# Patient Record
Sex: FEMALE | Race: WHITE | NOT HISPANIC OR LATINO | URBAN - METROPOLITAN AREA
[De-identification: names, ages, dates, MRNs, and addresses within clinical notes are randomized per-mention and may not be internally consistent; named-entity substitution may affect disease eponyms.]

---

## 2020-12-14 ENCOUNTER — TELEPHONE ENCOUNTER (OUTPATIENT)
Dept: URBAN - METROPOLITAN AREA CLINIC 2 | Facility: CLINIC | Age: 35
End: 2020-12-14

## 2020-12-15 ENCOUNTER — TELEPHONE ENCOUNTER (OUTPATIENT)
Dept: URBAN - METROPOLITAN AREA CLINIC 2 | Facility: CLINIC | Age: 35
End: 2020-12-15

## 2020-12-16 ENCOUNTER — TELEPHONE ENCOUNTER (OUTPATIENT)
Dept: URBAN - METROPOLITAN AREA CLINIC 80 | Facility: CLINIC | Age: 35
End: 2020-12-16

## 2020-12-17 ENCOUNTER — LAB OUTSIDE AN ENCOUNTER (OUTPATIENT)
Dept: URBAN - METROPOLITAN AREA TELEHEALTH 2 | Facility: TELEHEALTH | Age: 35
End: 2020-12-17

## 2020-12-17 ENCOUNTER — OFFICE VISIT (OUTPATIENT)
Dept: URBAN - METROPOLITAN AREA TELEHEALTH 2 | Facility: TELEHEALTH | Age: 35
End: 2020-12-17
Payer: MEDICARE

## 2020-12-17 DIAGNOSIS — R10.84 GENERALIZED ABDOMINAL PAIN: ICD-10-CM

## 2020-12-17 DIAGNOSIS — K51.911 ULCERATIVE COLITIS WITH RECTAL BLEEDING, UNSPECIFIED LOCATION: ICD-10-CM

## 2020-12-17 PROCEDURE — 99214 OFFICE O/P EST MOD 30 MIN: CPT | Performed by: INTERNAL MEDICINE

## 2020-12-17 PROCEDURE — G8482 FLU IMMUNIZE ORDER/ADMIN: HCPCS | Performed by: INTERNAL MEDICINE

## 2020-12-17 PROCEDURE — G8427 DOCREV CUR MEDS BY ELIG CLIN: HCPCS | Performed by: INTERNAL MEDICINE

## 2020-12-17 PROCEDURE — 1036F TOBACCO NON-USER: CPT | Performed by: INTERNAL MEDICINE

## 2020-12-17 PROCEDURE — G9903 PT SCRN TBCO ID AS NON USER: HCPCS | Performed by: INTERNAL MEDICINE

## 2020-12-17 RX ORDER — PREDNISONE 10 MG/1
1 TABLET TABLET ORAL ONCE A DAY
Qty: 30 | OUTPATIENT
Start: 2020-12-17

## 2020-12-17 RX ORDER — MESALAMINE 1.2 G/1
4 TABLETS TABLET, DELAYED RELEASE ORAL ONCE A DAY
Qty: 120 TABLET | Refills: 2 | OUTPATIENT
Start: 2020-12-17 | End: 2021-03-17

## 2020-12-17 NOTE — HPI-TODAY'S VISIT:
35 yr old female with ulcerative colitis not seen since 2018 as she lost insurance. Her symptoms began worsening July 2020. this time she has pain in lower abdomen along with bleeding and diarrhea. We sent Rx for mesalamine enema but she did not get call from pharmacy to . no antibioitcs recently or  hospitalization. since her last visit she was diagnosed as bipolar.

## 2020-12-18 LAB
A/G RATIO: 1.8
ALBUMIN: 4.2
ALKALINE PHOSPHATASE: 67
ALT (SGPT): 19
AST (SGOT): 18
BASO (ABSOLUTE): 0.1
BASOS: 1
BILIRUBIN, TOTAL: <0.2
BUN/CREATININE RATIO: 9
BUN: 8
C-REACTIVE PROTEIN, QUANT: 4
CALCIUM: 8.8
CARBON DIOXIDE, TOTAL: 23
CHLORIDE: 106
CREATININE: 0.94
EGFR IF AFRICN AM: 92
EGFR IF NONAFRICN AM: 79
EOS (ABSOLUTE): 0.2
EOS: 3
GLOBULIN, TOTAL: 2.4
GLUCOSE: 90
HEMATOCRIT: 37
HEMATOLOGY COMMENTS:: (no result)
HEMOGLOBIN: 11.5
IMMATURE CELLS: (no result)
IMMATURE GRANS (ABS): 0
IMMATURE GRANULOCYTES: 0
LYMPHS (ABSOLUTE): 2.8
LYMPHS: 33
MCH: 28.6
MCHC: 31.1
MCV: 92
MONOCYTES(ABSOLUTE): 0.9
MONOCYTES: 10
NEUTROPHILS (ABSOLUTE): 4.6
NEUTROPHILS: 53
NRBC: (no result)
PLATELETS: 381
POTASSIUM: 4.2
PROTEIN, TOTAL: 6.6
RBC: 4.02
RDW: 11.8
SODIUM: 142
WBC: 8.5

## 2020-12-21 LAB — GASTROINTESTINAL PATHOGEN: (no result)

## 2021-01-12 ENCOUNTER — OFFICE VISIT (OUTPATIENT)
Dept: URBAN - METROPOLITAN AREA CLINIC 2 | Facility: CLINIC | Age: 36
End: 2021-01-12

## 2021-01-14 ENCOUNTER — OFFICE VISIT (OUTPATIENT)
Dept: URBAN - METROPOLITAN AREA SURGERY CENTER 19 | Facility: SURGERY CENTER | Age: 36
End: 2021-01-14

## 2021-02-09 ENCOUNTER — OFFICE VISIT (OUTPATIENT)
Dept: URBAN - METROPOLITAN AREA CLINIC 2 | Facility: CLINIC | Age: 36
End: 2021-02-09
Payer: MEDICARE

## 2021-02-09 ENCOUNTER — WEB ENCOUNTER (OUTPATIENT)
Dept: URBAN - METROPOLITAN AREA CLINIC 2 | Facility: CLINIC | Age: 36
End: 2021-02-09

## 2021-02-09 DIAGNOSIS — K51.00 ULCERATIVE PANCOLITIS WITHOUT COMPLICATION: ICD-10-CM

## 2021-02-09 PROCEDURE — G8482 FLU IMMUNIZE ORDER/ADMIN: HCPCS | Performed by: INTERNAL MEDICINE

## 2021-02-09 PROCEDURE — G8427 DOCREV CUR MEDS BY ELIG CLIN: HCPCS | Performed by: INTERNAL MEDICINE

## 2021-02-09 PROCEDURE — G8417 CALC BMI ABV UP PARAM F/U: HCPCS | Performed by: INTERNAL MEDICINE

## 2021-02-09 PROCEDURE — G9903 PT SCRN TBCO ID AS NON USER: HCPCS | Performed by: INTERNAL MEDICINE

## 2021-02-09 PROCEDURE — 99214 OFFICE O/P EST MOD 30 MIN: CPT | Performed by: INTERNAL MEDICINE

## 2021-02-09 RX ORDER — MESALAMINE 1.2 G/1
4 TABLETS TABLET, DELAYED RELEASE ORAL ONCE A DAY
Qty: 120 TABLET | Refills: 2 | Status: DISCONTINUED | COMMUNITY
Start: 2020-12-17 | End: 2021-03-17

## 2021-02-09 RX ORDER — PREDNISONE 10 MG/1
1 TABLET TABLET ORAL ONCE A DAY
Qty: 30 | Status: DISCONTINUED | COMMUNITY
Start: 2020-12-17

## 2021-02-09 RX ORDER — MESALAMINE 1.2 G/1
3 TABLETS TABLET, DELAYED RELEASE ORAL ONCE A DAY
Qty: 90 | Refills: 2 | OUTPATIENT
Start: 2021-02-09 | End: 2021-05-10

## 2021-02-09 RX ORDER — PREDNISONE 20 MG/1
1 TABLET TABLET ORAL ONCE A DAY
Qty: 30 | OUTPATIENT
Start: 2021-02-09

## 2021-02-09 NOTE — HPI-OTHER HISTORIES
She comes in today for interval follow up. She recently re-established care in our clinic.  She was started on prednsione and noted feeling much better.  She did not restart mesalamine.  She had to cancel her colonoscopy.  She stopped her prednisone and her symptoms have all returned.

## 2021-03-22 ENCOUNTER — OFFICE VISIT (OUTPATIENT)
Dept: URBAN - METROPOLITAN AREA SURGERY CENTER 19 | Facility: SURGERY CENTER | Age: 36
End: 2021-03-22
Payer: MEDICARE

## 2021-03-22 DIAGNOSIS — K51.00 CHRONIC PANCOLONIC ULCERATIVE COLITIS: ICD-10-CM

## 2021-03-22 PROCEDURE — G8907 PT DOC NO EVENTS ON DISCHARG: HCPCS | Performed by: INTERNAL MEDICINE

## 2021-03-22 PROCEDURE — 45380 COLONOSCOPY AND BIOPSY: CPT | Performed by: INTERNAL MEDICINE

## 2021-03-22 RX ORDER — PREDNISONE 20 MG/1
1 TABLET TABLET ORAL ONCE A DAY
Qty: 30 | Status: ACTIVE | COMMUNITY
Start: 2021-02-09

## 2021-03-22 RX ORDER — MESALAMINE 1.2 G/1
3 TABLETS TABLET, DELAYED RELEASE ORAL ONCE A DAY
Qty: 90 | Refills: 2 | Status: ACTIVE | COMMUNITY
Start: 2021-02-09 | End: 2021-05-10

## 2021-03-23 ENCOUNTER — OFFICE VISIT (OUTPATIENT)
Dept: URBAN - METROPOLITAN AREA CLINIC 2 | Facility: CLINIC | Age: 36
End: 2021-03-23

## 2021-05-11 ENCOUNTER — OFFICE VISIT (OUTPATIENT)
Dept: URBAN - METROPOLITAN AREA CLINIC 2 | Facility: CLINIC | Age: 36
End: 2021-05-11
Payer: MEDICARE

## 2021-05-11 DIAGNOSIS — K51.011 ULCERATIVE PANCOLITIS WITH RECTAL BLEEDING: ICD-10-CM

## 2021-05-11 PROBLEM — 444548001: Status: ACTIVE | Noted: 2021-05-11

## 2021-05-11 PROCEDURE — 99214 OFFICE O/P EST MOD 30 MIN: CPT | Performed by: INTERNAL MEDICINE

## 2021-05-11 RX ORDER — PREDNISONE 20 MG/1
1 TABLET TABLET ORAL ONCE A DAY
Qty: 30 | Status: ACTIVE | COMMUNITY
Start: 2021-02-09

## 2021-05-11 NOTE — HPI-TODAY'S VISIT:
She comes in today for interval follow up.  She is continuing to have issues with diarrhea and bleeding despite compliance with mesalamine and prednisone.  She is interested in discussing additional treatment options.  Her colonoscopy was notable for signficant activity in the left colon.

## 2021-05-13 LAB
A/G RATIO: (no result)
A/G RATIO: 1.9
ALBUMIN: (no result)
ALBUMIN: 4.4
ALKALINE PHOSPHATASE: (no result)
ALKALINE PHOSPHATASE: 49
ALT (SGPT): (no result)
ALT (SGPT): 17
AST (SGOT): (no result)
AST (SGOT): 19
BASO (ABSOLUTE): 0.1
BASOS: 1
BILIRUBIN, TOTAL: (no result)
BILIRUBIN, TOTAL: 0.2
BUN/CREATININE RATIO: (no result)
BUN/CREATININE RATIO: 14
BUN: (no result)
BUN: 13
C-REACTIVE PROTEIN, QUANT: <1
CALCIUM: (no result)
CALCIUM: 9.2
CARBON DIOXIDE, TOTAL: (no result)
CARBON DIOXIDE, TOTAL: 22
CHLORIDE: (no result)
CHLORIDE: 105
CREATININE: (no result)
CREATININE: 0.9
EGFR IF AFRICN AM: (no result)
EGFR IF AFRICN AM: 96
EGFR IF NONAFRICN AM: (no result)
EGFR IF NONAFRICN AM: 83
EOS (ABSOLUTE): 0.1
EOS: 1
GLOBULIN, TOTAL: (no result)
GLOBULIN, TOTAL: 2.3
GLUCOSE: (no result)
GLUCOSE: 103
HBSAG SCREEN: NEGATIVE
HEMATOCRIT: 34.9
HEMATOLOGY COMMENTS:: (no result)
HEMOGLOBIN: 10.7
HEP A AB, IGM: NEGATIVE
HEP B CORE AB, IGM: NEGATIVE
HEP C VIRUS AB: <0.1
IMMATURE CELLS: (no result)
IMMATURE GRANS (ABS): 0
IMMATURE GRANULOCYTES: 0
LYMPHS (ABSOLUTE): 4.2
LYMPHS: 47
MCH: 29.2
MCHC: 30.7
MCV: 95
MONOCYTES(ABSOLUTE): 0.7
MONOCYTES: 7
NEUTROPHILS (ABSOLUTE): 3.9
NEUTROPHILS: 44
NRBC: (no result)
PLATELETS: 312
POTASSIUM: (no result)
POTASSIUM: 4.7
PROTEIN, TOTAL: (no result)
PROTEIN, TOTAL: 6.7
QUANTIFERON CRITERIA: (no result)
QUANTIFERON INCUBATION: (no result)
QUANTIFERON MITOGEN VALUE: 3.58
QUANTIFERON NIL VALUE: 0
QUANTIFERON TB1 AG VALUE: 0
QUANTIFERON TB2 AG VALUE: 0
QUANTIFERON-TB GOLD PLUS: NEGATIVE
RBC: 3.67
RDW: 13.2
REQUEST PROBLEM: (no result)
SODIUM: (no result)
SODIUM: 139
WBC: 9

## 2021-06-22 ENCOUNTER — TELEPHONE ENCOUNTER (OUTPATIENT)
Dept: URBAN - METROPOLITAN AREA CLINIC 92 | Facility: CLINIC | Age: 36
End: 2021-06-22

## 2021-07-16 ENCOUNTER — TELEPHONE ENCOUNTER (OUTPATIENT)
Dept: URBAN - METROPOLITAN AREA CLINIC 80 | Facility: CLINIC | Age: 36
End: 2021-07-16

## 2021-08-31 ENCOUNTER — OFFICE VISIT (OUTPATIENT)
Dept: URBAN - METROPOLITAN AREA CLINIC 2 | Facility: CLINIC | Age: 36
End: 2021-08-31

## 2021-08-31 ENCOUNTER — TELEPHONE ENCOUNTER (OUTPATIENT)
Dept: URBAN - METROPOLITAN AREA CLINIC 92 | Facility: CLINIC | Age: 36
End: 2021-08-31

## 2021-08-31 RX ORDER — PREDNISONE 20 MG/1
1 TABLET TABLET ORAL ONCE A DAY
Qty: 30 | Status: ACTIVE | COMMUNITY
Start: 2021-02-09

## 2021-08-31 RX ORDER — HYDROCORTISONE ACETATE 25 MG/1
1 SUPPOSITORY SUPPOSITORY RECTAL ONCE A DAY
Qty: 14 | Refills: 1 | OUTPATIENT
Start: 2021-08-31 | End: 2021-09-28

## 2022-01-06 ENCOUNTER — OFFICE VISIT (OUTPATIENT)
Dept: URBAN - METROPOLITAN AREA CLINIC 2 | Facility: CLINIC | Age: 37
End: 2022-01-06
Payer: MEDICARE

## 2022-01-06 DIAGNOSIS — K51.811 OTHER ULCERATIVE COLITIS WITH RECTAL BLEEDING: ICD-10-CM

## 2022-01-06 PROCEDURE — 99214 OFFICE O/P EST MOD 30 MIN: CPT | Performed by: NURSE PRACTITIONER

## 2022-01-06 RX ORDER — PREDNISONE 20 MG/1
1 TABLET TABLET ORAL ONCE A DAY
Qty: 30 | Status: ACTIVE | COMMUNITY
Start: 2021-02-09

## 2022-01-06 RX ORDER — BUDESONIDE 3 MG/1
3 CAPSULES DAILY FOR 2 WEEKS, THEN 2 TABS FOR 2 WEEKS THEN 1 TABLET FOR 2 WEEKS CAPSULE, COATED PELLETS ORAL ONCE A DAY
Qty: 84 CAPSULE | Refills: 0 | OUTPATIENT
Start: 2022-01-06

## 2022-01-06 NOTE — HPI-TODAY'S VISIT:
Very pleasant 36-year-old female with history of ulcerative colitis seen today for possible flare.  Symptoms have been well controlled since she started Lialda earlier this year.  Just before Thanksgiving she began having increased bowel movements 10+/day associated with tenesmus and excess mucus and occasional blood.  She does have some lower abdominal cramping intermittently.  She thinks she may have had some viral illness before her symptoms started.

## 2022-01-07 LAB
A/G RATIO: 1.6
ALBUMIN: 4
ALKALINE PHOSPHATASE: 52
ALT (SGPT): 12
AST (SGOT): 13
BASO (ABSOLUTE): 0.1
BASOS: 1
BILIRUBIN, TOTAL: 0.2
BUN/CREATININE RATIO: 11
BUN: 10
C-REACTIVE PROTEIN, QUANT: 7
CALCIUM: 8.9
CARBON DIOXIDE, TOTAL: 22
CHLORIDE: 106
CREATININE: 0.95
EGFR IF AFRICN AM: 90
EGFR IF NONAFRICN AM: 78
EOS (ABSOLUTE): 0.2
EOS: 2
GLOBULIN, TOTAL: 2.5
GLUCOSE: 84
HEMATOCRIT: 35.8
HEMATOLOGY COMMENTS:: (no result)
HEMOGLOBIN: 11.8
IMMATURE CELLS: (no result)
IMMATURE GRANS (ABS): 0
IMMATURE GRANULOCYTES: 0
LYMPHS (ABSOLUTE): 2.4
LYMPHS: 25
MCH: 29.7
MCHC: 33
MCV: 90
MONOCYTES(ABSOLUTE): 0.6
MONOCYTES: 6
NEUTROPHILS (ABSOLUTE): 6.5
NEUTROPHILS: 66
NRBC: (no result)
PLATELETS: 291
POTASSIUM: 4.5
PROTEIN, TOTAL: 6.5
RBC: 3.97
RDW: 11.9
SEDIMENTATION RATE-WESTERGREN: 18
SODIUM: 140
WBC: 9.7

## 2022-01-25 ENCOUNTER — TELEPHONE ENCOUNTER (OUTPATIENT)
Dept: URBAN - METROPOLITAN AREA CLINIC 92 | Facility: CLINIC | Age: 37
End: 2022-01-25

## 2022-01-25 RX ORDER — PREDNISONE 20 MG/1
1 TABLET TABLET ORAL ONCE A DAY
Qty: 30 | Status: ACTIVE | COMMUNITY
Start: 2021-02-09

## 2022-01-25 RX ORDER — BUDESONIDE 3 MG/1
3 CAPSULES DAILY FOR 2 WEEKS, THEN 2 TABS FOR 2 WEEKS THEN 1 TABLET FOR 2 WEEKS CAPSULE, COATED PELLETS ORAL ONCE A DAY
Qty: 84 CAPSULE | Refills: 0 | Status: ACTIVE | COMMUNITY
Start: 2022-01-06

## 2022-01-25 RX ORDER — PREDNISONE 10 MG/1
4 TABLET DAILY X7 DAYS, 3 TABS DAILY X7DAYS, 2 TABLETS X7DAYS, 1 TABLET X7 DAYS TABLET ORAL ONCE A DAY
Qty: 70 TABLET | Refills: 0 | OUTPATIENT
Start: 2022-01-25 | End: 2022-02-24

## 2022-02-17 ENCOUNTER — OFFICE VISIT (OUTPATIENT)
Dept: URBAN - METROPOLITAN AREA CLINIC 2 | Facility: CLINIC | Age: 37
End: 2022-02-17
Payer: MEDICARE

## 2022-02-17 DIAGNOSIS — K51.00 ULCERATIVE PANCOLITIS WITHOUT COMPLICATION: ICD-10-CM

## 2022-02-17 PROCEDURE — 99213 OFFICE O/P EST LOW 20 MIN: CPT | Performed by: NURSE PRACTITIONER

## 2022-02-17 RX ORDER — BUDESONIDE 3 MG/1
3 CAPSULES DAILY FOR 2 WEEKS, THEN 2 TABS FOR 2 WEEKS THEN 1 TABLET FOR 2 WEEKS CAPSULE, COATED PELLETS ORAL ONCE A DAY
Qty: 84 CAPSULE | Refills: 0 | Status: ACTIVE | COMMUNITY
Start: 2022-01-06

## 2022-02-17 RX ORDER — PREDNISONE 20 MG/1
1 TABLET TABLET ORAL ONCE A DAY
Qty: 30 | Status: ACTIVE | COMMUNITY
Start: 2021-02-09

## 2022-02-17 RX ORDER — MESALAMINE 1.2 G/1
4 TABLETS TABLET, DELAYED RELEASE ORAL ONCE A DAY
Status: ACTIVE | COMMUNITY

## 2022-02-17 RX ORDER — PREDNISONE 10 MG/1
4 TABLET DAILY X7 DAYS, 3 TABS DAILY X7DAYS, 2 TABLETS X7DAYS, 1 TABLET X7 DAYS TABLET ORAL ONCE A DAY
Qty: 70 TABLET | Refills: 0 | Status: ACTIVE | COMMUNITY
Start: 2022-01-25 | End: 2022-02-24

## 2022-02-17 NOTE — HPI-TODAY'S VISIT:
Very pleasant 37-year-old female seen in follow-up today for possible ulcerative colitis flare.  Patient takes mesalamine daily.  She began having diarrhea around Thanksgiving.  We saw patient last month and checked routine labs in order stool studies.  Labs were essentially normal stool studies were negative.  We did start patient on budesonide, but she could not tolerate the medication. So started prednisone.  She is seen in follow-up today. She feels well. urgency and diarrhea have resolved. She has tapered to 10mg prednisone and feels well. will stop this weekend.

## 2022-04-18 ENCOUNTER — TELEPHONE ENCOUNTER (OUTPATIENT)
Dept: URBAN - METROPOLITAN AREA CLINIC 80 | Facility: CLINIC | Age: 37
End: 2022-04-18

## 2022-04-18 RX ORDER — MESALAMINE 1.2 G/1
4 TABLETS TABLET, DELAYED RELEASE ORAL ONCE A DAY
Qty: 90 | Refills: 2

## 2022-05-04 ENCOUNTER — TELEPHONE ENCOUNTER (OUTPATIENT)
Dept: URBAN - METROPOLITAN AREA CLINIC 92 | Facility: CLINIC | Age: 37
End: 2022-05-04

## 2022-05-16 ENCOUNTER — LAB OUTSIDE AN ENCOUNTER (OUTPATIENT)
Dept: URBAN - METROPOLITAN AREA CLINIC 80 | Facility: CLINIC | Age: 37
End: 2022-05-16

## 2022-05-16 ENCOUNTER — OFFICE VISIT (OUTPATIENT)
Dept: URBAN - METROPOLITAN AREA CLINIC 2 | Facility: CLINIC | Age: 37
End: 2022-05-16

## 2022-05-17 LAB
A/G RATIO: 1.6
ALBUMIN: 4.1
ALKALINE PHOSPHATASE: 59
ALT (SGPT): 13
AST (SGOT): 19
BASO (ABSOLUTE): 0.1
BASOS: 1
BILIRUBIN, TOTAL: 0.3
BUN/CREATININE RATIO: 12
BUN: 10
C-REACTIVE PROTEIN, QUANT: 2
CALCIUM: 9
CARBON DIOXIDE, TOTAL: 24
CHLORIDE: 106
CREATININE: 0.82
EGFR: 95
EOS (ABSOLUTE): 0.1
EOS: 1
GLOBULIN, TOTAL: 2.6
GLUCOSE: 86
HEMATOCRIT: 38.4
HEMATOLOGY COMMENTS:: (no result)
HEMOGLOBIN: 12.1
IMMATURE CELLS: (no result)
IMMATURE GRANS (ABS): 0
IMMATURE GRANULOCYTES: 0
LYMPHS (ABSOLUTE): 2.2
LYMPHS: 29
MCH: 28.9
MCHC: 31.5
MCV: 92
MONOCYTES(ABSOLUTE): 0.5
MONOCYTES: 6
NEUTROPHILS (ABSOLUTE): 4.7
NEUTROPHILS: 63
NRBC: (no result)
PLATELETS: 278
POTASSIUM: 4.5
PROTEIN, TOTAL: 6.7
RBC: 4.19
RDW: 12.1
SEDIMENTATION RATE-WESTERGREN: 12
SODIUM: 142
WBC: 7.6

## 2022-05-23 ENCOUNTER — OFFICE VISIT (OUTPATIENT)
Dept: URBAN - METROPOLITAN AREA CLINIC 2 | Facility: CLINIC | Age: 37
End: 2022-05-23
Payer: MEDICARE

## 2022-05-23 DIAGNOSIS — K51.00 ULCERATIVE PANCOLITIS WITHOUT COMPLICATION: ICD-10-CM

## 2022-05-23 PROBLEM — 444548001: Status: ACTIVE | Noted: 2021-02-09

## 2022-05-23 PROCEDURE — 99213 OFFICE O/P EST LOW 20 MIN: CPT | Performed by: NURSE PRACTITIONER

## 2022-05-23 RX ORDER — MESALAMINE 1.2 G/1
4 TABLETS TABLET, DELAYED RELEASE ORAL ONCE A DAY
Qty: 360 TABLET | Refills: 1

## 2022-05-23 RX ORDER — MESALAMINE 1.2 G/1
4 TABLETS TABLET, DELAYED RELEASE ORAL ONCE A DAY
Qty: 90 | Refills: 2 | Status: ACTIVE | COMMUNITY

## 2022-05-23 RX ORDER — BUDESONIDE 3 MG/1
3 CAPSULES DAILY FOR 2 WEEKS, THEN 2 TABS FOR 2 WEEKS THEN 1 TABLET FOR 2 WEEKS CAPSULE, COATED PELLETS ORAL ONCE A DAY
Qty: 84 CAPSULE | Refills: 0 | Status: ACTIVE | COMMUNITY
Start: 2022-01-06

## 2022-05-23 RX ORDER — PREDNISONE 20 MG/1
1 TABLET TABLET ORAL ONCE A DAY
Qty: 30 | Status: ACTIVE | COMMUNITY
Start: 2021-02-09

## 2022-05-23 NOTE — HPI-TODAY'S VISIT:
For [id number] follow-up of ulcerative colitis.  Her symptoms are overall very well controlled.  She is taking mesalamine 4 tablets daily.  She has some urgency with bowel movements.  Typically has 1 to 3/day.  She denies abdominal pain.  Denies overt GI bleeding.  We did check labs prior to this visit which were all normal.  Labs are reviewed and in chart

## 2022-07-11 ENCOUNTER — ERX REFILL RESPONSE (OUTPATIENT)
Dept: URBAN - METROPOLITAN AREA CLINIC 80 | Facility: CLINIC | Age: 37
End: 2022-07-11

## 2022-07-11 RX ORDER — MESALAMINE 1.2 G/1
4 TABLETS TABLET, DELAYED RELEASE ORAL ONCE A DAY
Qty: 360 TABLET | Refills: 1 | OUTPATIENT

## 2022-07-19 ENCOUNTER — TELEPHONE ENCOUNTER (OUTPATIENT)
Dept: URBAN - METROPOLITAN AREA CLINIC 80 | Facility: CLINIC | Age: 37
End: 2022-07-19

## 2022-08-08 ENCOUNTER — TELEPHONE ENCOUNTER (OUTPATIENT)
Dept: URBAN - METROPOLITAN AREA CLINIC 23 | Facility: CLINIC | Age: 37
End: 2022-08-08

## 2022-08-22 ENCOUNTER — ERX REFILL RESPONSE (OUTPATIENT)
Dept: URBAN - METROPOLITAN AREA CLINIC 80 | Facility: CLINIC | Age: 37
End: 2022-08-22

## 2022-08-22 ENCOUNTER — LAB OUTSIDE AN ENCOUNTER (OUTPATIENT)
Dept: URBAN - METROPOLITAN AREA CLINIC 80 | Facility: CLINIC | Age: 37
End: 2022-08-22

## 2022-08-22 ENCOUNTER — OFFICE VISIT (OUTPATIENT)
Dept: URBAN - METROPOLITAN AREA CLINIC 2 | Facility: CLINIC | Age: 37
End: 2022-08-22
Payer: MEDICARE

## 2022-08-22 VITALS
HEIGHT: 64 IN | BODY MASS INDEX: 33.12 KG/M2 | SYSTOLIC BLOOD PRESSURE: 121 MMHG | TEMPERATURE: 97.9 F | DIASTOLIC BLOOD PRESSURE: 78 MMHG | WEIGHT: 194 LBS

## 2022-08-22 DIAGNOSIS — K51.811 OTHER ULCERATIVE COLITIS WITH RECTAL BLEEDING: ICD-10-CM

## 2022-08-22 DIAGNOSIS — L29.0 RECTAL ITCHING: ICD-10-CM

## 2022-08-22 PROCEDURE — 99214 OFFICE O/P EST MOD 30 MIN: CPT | Performed by: NURSE PRACTITIONER

## 2022-08-22 RX ORDER — PREDNISONE 20 MG/1
1 TABLET TABLET ORAL ONCE A DAY
Qty: 30 | Status: ACTIVE | COMMUNITY
Start: 2021-02-09

## 2022-08-22 RX ORDER — MESALAMINE 1.2 G/1
TAKE FOUR TABLETS BY MOUTH ONE TIME DAILY TABLET, DELAYED RELEASE ORAL
Qty: 90 TABLET | Refills: 1 | OUTPATIENT

## 2022-08-22 RX ORDER — BUDESONIDE 3 MG/1
3 CAPSULES DAILY FOR 2 WEEKS, THEN 2 TABS FOR 2 WEEKS THEN 1 TABLET FOR 2 WEEKS CAPSULE, COATED PELLETS ORAL ONCE A DAY
Qty: 84 CAPSULE | Refills: 0 | Status: ACTIVE | COMMUNITY
Start: 2022-01-06

## 2022-08-22 RX ORDER — MESALAMINE 1.2 G/1
4 TABLETS TABLET, DELAYED RELEASE ORAL ONCE A DAY
Qty: 360 TABLET | Refills: 1 | Status: ACTIVE | COMMUNITY

## 2022-08-22 RX ORDER — MESALAMINE 1.2 G/1
TAKE FOUR TABLETS BY MOUTH ONE TIME DAILY TABLET, DELAYED RELEASE ORAL
Qty: 90 TABLET | Refills: 0 | OUTPATIENT

## 2022-08-22 NOTE — HPI-TODAY'S VISIT:
Very pleasant 37-year-old female with ulcerative colitis seen today with increasing symptoms.  She is on mesalamine 4 tablets daily.  However, she has about 8 bowel movements daily with mostly blood and mucus.  Her weight is stable.  Her appetite is good.  She does complain of rectal itching. At her last visit, symptoms had seemed to have improved.  However, they are worsening again.  She did try probiotics but is unsure if this improves her symptoms any.  She is avoiding breads and soft drinks. She was diagnosed with ulcerative colitis in 2014.  However, her symptoms were present years before diagnosis.

## 2022-08-23 ENCOUNTER — ERX REFILL RESPONSE (OUTPATIENT)
Dept: URBAN - METROPOLITAN AREA CLINIC 80 | Facility: CLINIC | Age: 37
End: 2022-08-23

## 2022-08-23 ENCOUNTER — LAB OUTSIDE AN ENCOUNTER (OUTPATIENT)
Dept: URBAN - METROPOLITAN AREA CLINIC 80 | Facility: CLINIC | Age: 37
End: 2022-08-23

## 2022-08-23 PROBLEM — 64766004: Status: ACTIVE | Noted: 2020-12-17

## 2022-08-23 LAB — SED RATE BY MODIFIED: 11

## 2022-08-23 RX ORDER — MESALAMINE 1.2 G/1
TAKE FOUR TABLETS BY MOUTH ONE TIME DAILY TABLET, DELAYED RELEASE ORAL
Qty: 90 TABLET | Refills: 0 | OUTPATIENT

## 2022-08-23 RX ORDER — MESALAMINE 1.2 G/1
TAKE FOUR TABLETS BY MOUTH ONE TIME DAILY TABLET, DELAYED RELEASE ORAL
Qty: 90 TABLET | Refills: 1 | OUTPATIENT

## 2022-08-25 LAB
A/G RATIO: 1.6
ABSOLUTE BASOPHILS: 50
ABSOLUTE EOSINOPHILS: 99
ABSOLUTE LYMPHOCYTES: 2151
ABSOLUTE MONOCYTES: 440
ABSOLUTE NEUTROPHILS: 3460
ALBUMIN: 3.9
ALKALINE PHOSPHATASE: 60
ALT (SGPT): 18
AST (SGOT): 19
BASOPHILS: 0.8
BILIRUBIN, TOTAL: 0.5
BUN/CREATININE RATIO: (no result)
BUN: 11
C-REACTIVE PROTEIN, QUANT: 3.2
CALCIUM: 8.9
CARBON DIOXIDE, TOTAL: 23
CHLORIDE: 106
CREATININE: 0.86
EGFR: 90
EOSINOPHILS: 1.6
GLOBULIN, TOTAL: 2.4
GLUCOSE: 80
HBSAG SCREEN: (no result)
HEMATOCRIT: 35.7
HEMOGLOBIN: 11.6
HEP A AB, IGM: (no result)
HEP B CORE AB, IGM: (no result)
HEP C VIRUS AB: 0.03
HEPATITIS C ANTIBODY: (no result)
LYMPHOCYTES: 34.7
MCH: 29.1
MCHC: 32.5
MCV: 89.7
MONOCYTES: 7.1
MPV: 12.2
NEUTROPHILS: 55.8
PLATELET COUNT: 249
POTASSIUM: 3.9
PROTEIN, TOTAL: 6.3
RDW: 13.1
RED BLOOD CELL COUNT: 3.98
SODIUM: 141
WHITE BLOOD CELL COUNT: 6.2

## 2022-08-26 LAB
GLUCOSE: (no result)
HBSAG SCREEN: (no result)
HEP A AB, IGM: (no result)
HEP B CORE AB, IGM: (no result)
HEPATITIS C ANTIBODY: (no result)
MITOGEN-NIL: >10
NIL: 0.02
QUANTIFERON(R)-TB GOLD: NEGATIVE
TB1-NIL: 0
TB2-NIL: 0
WHITE BLOOD CELL COUNT: (no result)

## 2022-10-03 ENCOUNTER — ERX REFILL RESPONSE (OUTPATIENT)
Dept: URBAN - METROPOLITAN AREA CLINIC 80 | Facility: CLINIC | Age: 37
End: 2022-10-03

## 2022-10-03 RX ORDER — MESALAMINE 1.2 G/1
TAKE FOUR TABLETS BY MOUTH ONE TIME DAILY FOR 30 DAYS TABLET, DELAYED RELEASE ORAL
Qty: 90 TABLET | Refills: 1 | OUTPATIENT

## 2022-10-03 RX ORDER — MESALAMINE 1.2 G/1
TAKE FOUR TABLETS BY MOUTH ONE TIME DAILY FOR 30 DAYS TABLET, DELAYED RELEASE ORAL
Qty: 90 TABLET | Refills: 0 | OUTPATIENT

## 2022-10-04 ENCOUNTER — ERX REFILL RESPONSE (OUTPATIENT)
Dept: URBAN - METROPOLITAN AREA CLINIC 80 | Facility: CLINIC | Age: 37
End: 2022-10-04

## 2022-10-04 RX ORDER — MESALAMINE 1.2 G/1
TAKE FOUR TABLETS BY MOUTH ONE TIME DAILY FOR 30 DAYS TABLET, DELAYED RELEASE ORAL
Qty: 90 TABLET | Refills: 1 | OUTPATIENT

## 2022-10-04 RX ORDER — MESALAMINE 1.2 G/1
TAKE FOUR TABLETS BY MOUTH ONE TIME DAILY FOR 30 DAYS TABLET, DELAYED RELEASE ORAL
Qty: 90 TABLET | Refills: 0 | OUTPATIENT

## 2022-10-17 ENCOUNTER — OFFICE VISIT (OUTPATIENT)
Dept: URBAN - METROPOLITAN AREA SURGERY CENTER 19 | Facility: SURGERY CENTER | Age: 37
End: 2022-10-17

## 2022-10-17 ENCOUNTER — CLAIMS CREATED FROM THE CLAIM WINDOW (OUTPATIENT)
Dept: URBAN - METROPOLITAN AREA CLINIC 4 | Facility: CLINIC | Age: 37
End: 2022-10-17
Payer: MEDICARE

## 2022-10-17 ENCOUNTER — CLAIMS CREATED FROM THE CLAIM WINDOW (OUTPATIENT)
Dept: URBAN - METROPOLITAN AREA SURGERY CENTER 19 | Facility: SURGERY CENTER | Age: 37
End: 2022-10-17
Payer: MEDICARE

## 2022-10-17 DIAGNOSIS — K51.00 ACUTE ULCERATIVE PANCOLITIS: ICD-10-CM

## 2022-10-17 DIAGNOSIS — K63.89 OTHER SPECIFIED DISEASES OF INTESTINE: ICD-10-CM

## 2022-10-17 PROCEDURE — 45380 COLONOSCOPY AND BIOPSY: CPT | Performed by: INTERNAL MEDICINE

## 2022-10-17 PROCEDURE — G8907 PT DOC NO EVENTS ON DISCHARG: HCPCS | Performed by: INTERNAL MEDICINE

## 2022-10-17 PROCEDURE — 88305 TISSUE EXAM BY PATHOLOGIST: CPT | Performed by: PATHOLOGY

## 2022-10-17 RX ORDER — PREDNISONE 20 MG/1
1 TABLET TABLET ORAL ONCE A DAY
Qty: 30 | Status: ACTIVE | COMMUNITY
Start: 2021-02-09

## 2022-10-17 RX ORDER — BUDESONIDE 3 MG/1
3 CAPSULES DAILY FOR 2 WEEKS, THEN 2 TABS FOR 2 WEEKS THEN 1 TABLET FOR 2 WEEKS CAPSULE, COATED PELLETS ORAL ONCE A DAY
Qty: 84 CAPSULE | Refills: 0 | Status: ACTIVE | COMMUNITY
Start: 2022-01-06

## 2022-10-17 RX ORDER — MESALAMINE 1.2 G/1
TAKE FOUR TABLETS BY MOUTH ONE TIME DAILY FOR 30 DAYS TABLET, DELAYED RELEASE ORAL
Qty: 90 TABLET | Refills: 0 | Status: ACTIVE | COMMUNITY

## 2022-11-28 ENCOUNTER — ERX REFILL RESPONSE (OUTPATIENT)
Dept: URBAN - METROPOLITAN AREA CLINIC 80 | Facility: CLINIC | Age: 37
End: 2022-11-28

## 2022-11-28 RX ORDER — MESALAMINE 1.2 G/1
TAKE FOUR TABLETS BY MOUTH ONE TIME DAILY FOR 30 DAYS TABLET, DELAYED RELEASE ORAL
Qty: 90 TABLET | Refills: 0 | OUTPATIENT

## 2022-11-28 RX ORDER — MESALAMINE 1.2 G/1
TAKE FOUR TABLETS BY MOUTH ONE TIME DAILY FOR 30 DAYS TABLET, DELAYED RELEASE ORAL
Qty: 90 TABLET | Refills: 1 | OUTPATIENT

## 2022-11-29 ENCOUNTER — ERX REFILL RESPONSE (OUTPATIENT)
Dept: URBAN - METROPOLITAN AREA CLINIC 80 | Facility: CLINIC | Age: 37
End: 2022-11-29

## 2022-11-29 RX ORDER — MESALAMINE 1.2 G/1
TAKE FOUR TABLETS BY MOUTH ONE TIME DAILY FOR 30 DAYS TABLET, DELAYED RELEASE ORAL
Qty: 90 TABLET | Refills: 0 | OUTPATIENT

## 2022-11-29 RX ORDER — MESALAMINE 1.2 G/1
TAKE FOUR TABLETS BY MOUTH ONE TIME DAILY FOR 30 DAYS TABLET, DELAYED RELEASE ORAL
Qty: 90 TABLET | Refills: 1 | OUTPATIENT

## 2022-12-05 ENCOUNTER — OFFICE VISIT (OUTPATIENT)
Dept: URBAN - METROPOLITAN AREA CLINIC 2 | Facility: CLINIC | Age: 37
End: 2022-12-05
Payer: MEDICARE

## 2022-12-05 ENCOUNTER — TELEPHONE ENCOUNTER (OUTPATIENT)
Dept: URBAN - METROPOLITAN AREA CLINIC 92 | Facility: CLINIC | Age: 37
End: 2022-12-05

## 2022-12-05 VITALS
BODY MASS INDEX: 33.46 KG/M2 | TEMPERATURE: 98.2 F | WEIGHT: 196 LBS | HEIGHT: 64 IN | SYSTOLIC BLOOD PRESSURE: 112 MMHG | DIASTOLIC BLOOD PRESSURE: 72 MMHG

## 2022-12-05 DIAGNOSIS — K51.311 ULCERATIVE RECTOSIGMOIDITIS WITH RECTAL BLEEDING: ICD-10-CM

## 2022-12-05 PROBLEM — 41364008: Status: ACTIVE | Noted: 2022-12-05

## 2022-12-05 PROCEDURE — 99213 OFFICE O/P EST LOW 20 MIN: CPT | Performed by: NURSE PRACTITIONER

## 2022-12-05 RX ORDER — BUDESONIDE 3 MG/1
3 CAPSULES DAILY FOR 2 WEEKS, THEN 2 TABS FOR 2 WEEKS THEN 1 TABLET FOR 2 WEEKS CAPSULE, COATED PELLETS ORAL ONCE A DAY
Qty: 84 CAPSULE | Refills: 0 | Status: ON HOLD | COMMUNITY
Start: 2022-01-06

## 2022-12-05 RX ORDER — MESALAMINE 1.2 G/1
TAKE FOUR TABLETS BY MOUTH ONE TIME DAILY FOR 30 DAYS TABLET, DELAYED RELEASE ORAL
Qty: 90 TABLET | Refills: 0 | Status: ACTIVE | COMMUNITY

## 2022-12-05 RX ORDER — PREDNISONE 20 MG/1
1 TABLET TABLET ORAL ONCE A DAY
Qty: 30 | Status: ON HOLD | COMMUNITY
Start: 2021-02-09

## 2022-12-05 NOTE — HPI-TODAY'S VISIT:
Very pleasant 37-year-old female with ulcerative colitis seen in follow-up after colonoscopy.  Colonoscopy was notable for erythematous mucosa in the rectosigmoid colon otherwise normal.  Biopsies were notable for active rectosigmoid colitis.  Prior to colonoscopy she had been taking mesalamine but complained of 8 bloody bowel movements daily.  Labs from her last office visit with nonreactive acute hepatitis panel.  TB test negative. Since her colonoscopy she has resumed oral contraceptives and UC symptoms have improved. She also stopped using cottonelle wipes which were causing external irritation. Now having 1 bm/day with no blood. occasional mucus.

## 2022-12-06 LAB
A/G RATIO: 1.5
ALBUMIN: 4.2
ALKALINE PHOSPHATASE: 63
ALT (SGPT): 9
AST (SGOT): 14
BILIRUBIN, TOTAL: 0.4
BUN/CREATININE RATIO: (no result)
BUN: 15
C-REACTIVE PROTEIN, QUANT: 3.5
CALCIUM: 9.4
CARBON DIOXIDE, TOTAL: 24
CHLORIDE: 103
CREATININE: 0.84
EGFR: 92
GLOBULIN, TOTAL: 2.8
GLUCOSE: 78
HEMATOCRIT: 35.8
HEMOGLOBIN: 12.1
MCH: 29.1
MCHC: 33.8
MCV: 86.1
MPV: 12.3
PLATELET COUNT: 324
POTASSIUM: 4.1
PROTEIN, TOTAL: 7
RDW: 12.8
RED BLOOD CELL COUNT: 4.16
SODIUM: 139
WHITE BLOOD CELL COUNT: 8.5

## 2023-02-28 ENCOUNTER — TELEPHONE ENCOUNTER (OUTPATIENT)
Dept: URBAN - METROPOLITAN AREA SURGERY CENTER 31 | Facility: SURGERY CENTER | Age: 38
End: 2023-02-28

## 2023-02-28 RX ORDER — MESALAMINE 1.2 G/1
4 TABLETS TABLET, DELAYED RELEASE ORAL ONCE A DAY
Qty: 120 TABLET | Refills: 2

## 2023-03-02 ENCOUNTER — OFFICE VISIT (OUTPATIENT)
Dept: URBAN - METROPOLITAN AREA CLINIC 2 | Facility: CLINIC | Age: 38
End: 2023-03-02

## 2023-03-13 ENCOUNTER — OFFICE VISIT (OUTPATIENT)
Dept: URBAN - METROPOLITAN AREA CLINIC 2 | Facility: CLINIC | Age: 38
End: 2023-03-13
Payer: MEDICARE

## 2023-03-13 VITALS
TEMPERATURE: 97.8 F | HEIGHT: 64 IN | BODY MASS INDEX: 33.63 KG/M2 | SYSTOLIC BLOOD PRESSURE: 119 MMHG | DIASTOLIC BLOOD PRESSURE: 77 MMHG | WEIGHT: 197 LBS

## 2023-03-13 DIAGNOSIS — K51.30 ULCERATIVE RECTOSIGMOIDITIS WITHOUT COMPLICATION: ICD-10-CM

## 2023-03-13 PROBLEM — 41364008: Status: ACTIVE | Noted: 2023-03-13

## 2023-03-13 PROCEDURE — 99213 OFFICE O/P EST LOW 20 MIN: CPT | Performed by: NURSE PRACTITIONER

## 2023-03-13 RX ORDER — BUDESONIDE 3 MG/1
3 CAPSULES DAILY FOR 2 WEEKS, THEN 2 TABS FOR 2 WEEKS THEN 1 TABLET FOR 2 WEEKS CAPSULE, COATED PELLETS ORAL ONCE A DAY
Qty: 84 CAPSULE | Refills: 0 | Status: ON HOLD | COMMUNITY
Start: 2022-01-06

## 2023-03-13 RX ORDER — MESALAMINE 1.2 G/1
4 TABLETS TABLET, DELAYED RELEASE ORAL ONCE A DAY
Qty: 120 TABLET | Refills: 2 | Status: ACTIVE | COMMUNITY

## 2023-03-13 RX ORDER — MESALAMINE 1.2 G/1
4 TABLETS TABLET, DELAYED RELEASE ORAL ONCE A DAY
Qty: 360 TABLET | Refills: 1 | OUTPATIENT
Start: 2023-03-13 | End: 2023-09-09

## 2023-03-13 RX ORDER — PREDNISONE 20 MG/1
1 TABLET TABLET ORAL ONCE A DAY
Qty: 30 | Status: ON HOLD | COMMUNITY
Start: 2021-02-09

## 2023-03-13 NOTE — HPI-TODAY'S VISIT:
Patient is doing well since our last visit.  No further diarrhea or hematochezia.  She is taking mesalamine 4 pills/day.  She did resume oral contraceptives.  She typically has 1 bowel movement per day.  She denies abdominal pain and overall feels well.

## 2023-03-14 ENCOUNTER — OFFICE VISIT (OUTPATIENT)
Dept: URBAN - METROPOLITAN AREA CLINIC 2 | Facility: CLINIC | Age: 38
End: 2023-03-14

## 2023-03-17 LAB
A/G RATIO: 1.5
ALBUMIN: 4.1
ALKALINE PHOSPHATASE: 51
ALT (SGPT): 10
AST (SGOT): 16
BILIRUBIN, TOTAL: 0.3
BUN/CREATININE RATIO: (no result)
BUN: 13
C-REACTIVE PROTEIN, QUANT: 14.5
CALCIUM: 9.2
CARBON DIOXIDE, TOTAL: 24
CHLORIDE: 107
CREATININE: 0.87
EGFR: 88
GLOBULIN, TOTAL: 2.7
GLUCOSE: 85
HEMATOCRIT: 35.9
HEMOGLOBIN: 12
MCH: 29.3
MCHC: 33.4
MCV: 87.6
MPV: 12.3
PLATELET COUNT: 294
POTASSIUM: 4.5
PROTEIN, TOTAL: 6.8
RDW: 12.4
RED BLOOD CELL COUNT: 4.1
SED RATE BY MODIFIED: 29
SODIUM: 140
WHITE BLOOD CELL COUNT: 6.3

## 2023-03-27 ENCOUNTER — ERX REFILL RESPONSE (OUTPATIENT)
Dept: URBAN - METROPOLITAN AREA CLINIC 80 | Facility: CLINIC | Age: 38
End: 2023-03-27

## 2023-03-27 RX ORDER — MESALAMINE 1.2 G/1
4 TABLETS TABLET, DELAYED RELEASE ORAL ONCE A DAY
Qty: 120 TABLET | Refills: 2 | OUTPATIENT

## 2023-04-29 NOTE — PHYSICAL EXAM CARDIOVASCULAR:
no edema,  no murmurs,  regular rate and rhythm , no edema.
Patient evaluated for nasal congestion and sore throat, able to tolerate p.o.  Throat culture sent and patient treated with meds in ED.  Advised to follow-up closely for reevaluation with PMD and agreed.  Strict return precautions advised and patient verbalized understanding.

## 2023-09-11 ENCOUNTER — OFFICE VISIT (OUTPATIENT)
Dept: URBAN - METROPOLITAN AREA CLINIC 2 | Facility: CLINIC | Age: 38
End: 2023-09-11

## 2023-09-17 ENCOUNTER — WEB ENCOUNTER (OUTPATIENT)
Dept: URBAN - METROPOLITAN AREA CLINIC 2 | Facility: CLINIC | Age: 38
End: 2023-09-17

## 2023-09-21 ENCOUNTER — OFFICE VISIT (OUTPATIENT)
Dept: URBAN - METROPOLITAN AREA CLINIC 2 | Facility: CLINIC | Age: 38
End: 2023-09-21
Payer: MEDICARE

## 2023-09-21 VITALS
TEMPERATURE: 97.6 F | HEIGHT: 64 IN | BODY MASS INDEX: 33.32 KG/M2 | HEART RATE: 63 BPM | DIASTOLIC BLOOD PRESSURE: 67 MMHG | WEIGHT: 195.2 LBS | SYSTOLIC BLOOD PRESSURE: 104 MMHG

## 2023-09-21 DIAGNOSIS — K51.30 ULCERATIVE RECTOSIGMOIDITIS WITHOUT COMPLICATION: ICD-10-CM

## 2023-09-21 PROCEDURE — 99213 OFFICE O/P EST LOW 20 MIN: CPT | Performed by: NURSE PRACTITIONER

## 2023-09-21 RX ORDER — MESALAMINE 1.2 G/1
4 TABLETS TABLET, DELAYED RELEASE ORAL ONCE A DAY
Qty: 360 TABLET | Refills: 1

## 2023-09-21 RX ORDER — BUDESONIDE 3 MG/1
3 CAPSULES DAILY FOR 2 WEEKS, THEN 2 TABS FOR 2 WEEKS THEN 1 TABLET FOR 2 WEEKS CAPSULE, COATED PELLETS ORAL ONCE A DAY
Qty: 84 CAPSULE | Refills: 0 | Status: ON HOLD | COMMUNITY
Start: 2022-01-06

## 2023-09-21 RX ORDER — PREDNISONE 20 MG/1
1 TABLET TABLET ORAL ONCE A DAY
Qty: 30 | Status: ON HOLD | COMMUNITY
Start: 2021-02-09

## 2023-09-21 RX ORDER — MESALAMINE 1.2 G/1
4 TABLETS TABLET, DELAYED RELEASE ORAL ONCE A DAY
Qty: 120 TABLET | Refills: 2 | Status: ACTIVE | COMMUNITY

## 2023-09-21 RX ORDER — CYCLOBENZAPRINE HYDROCHLORIDE 10 MG/1
TAKE ONE TABLET BY MOUTH TWICE A DAY AS NEEDED FOR MUSCLE SPASM (PAIN) TABLET, FILM COATED ORAL
Qty: 14 UNSPECIFIED | Refills: 0 | Status: ACTIVE | COMMUNITY

## 2023-09-21 NOTE — HPI-TODAY'S VISIT:
Very pleasant 38 yr old female with UC. here in interval follow up. Doing well. no flares in past 6 mo. no abdominal pain or blood in sstool. has some sciatic pain. no rash or eye pain. eyes itch occasionally.

## 2023-09-26 LAB
A/G RATIO: 1.8
ABSOLUTE BASOPHILS: 56
ABSOLUTE EOSINOPHILS: 85
ABSOLUTE LYMPHOCYTES: 3328
ABSOLUTE MONOCYTES: 508
ABSOLUTE NEUTROPHILS: 5424
ALBUMIN: 4.1
ALKALINE PHOSPHATASE: 61
ALT (SGPT): 14
AST (SGOT): 15
BASOPHILS: 0.6
BILIRUBIN, TOTAL: 0.2
BUN/CREATININE RATIO: (no result)
BUN: 12
C-REACTIVE PROTEIN, QUANT: 14.6
CALCIUM: 9.2
CALPROTECTIN, FECAL: (no result)
CARBON DIOXIDE, TOTAL: 21
CHLORIDE: 108
CREATININE: 0.8
EGFR: 97
EOSINOPHILS: 0.9
GLOBULIN, TOTAL: 2.3
GLUCOSE: 90
HEMATOCRIT: 37.2
HEMOGLOBIN: 12.1
LYMPHOCYTES: 35.4
MCH: 29.6
MCHC: 32.5
MCV: 91
MONOCYTES: 5.4
MPV: 12.2
NEUTROPHILS: 57.7
PLATELET COUNT: 312
POTASSIUM: 4.6
PROTEIN, TOTAL: 6.4
RDW: 12.5
RED BLOOD CELL COUNT: 4.09
SODIUM: 139
WHITE BLOOD CELL COUNT: 9.4

## 2023-10-02 ENCOUNTER — LAB OUTSIDE AN ENCOUNTER (OUTPATIENT)
Dept: URBAN - METROPOLITAN AREA CLINIC 80 | Facility: CLINIC | Age: 38
End: 2023-10-02

## 2023-10-11 LAB — CALPROTECTIN, FECAL: 17

## 2024-01-23 ENCOUNTER — TELEPHONE ENCOUNTER (OUTPATIENT)
Dept: URBAN - METROPOLITAN AREA CLINIC 79 | Facility: CLINIC | Age: 39
End: 2024-01-23

## 2024-01-23 RX ORDER — MESALAMINE 1.2 G/1
4 TABLETS TABLET, DELAYED RELEASE ORAL ONCE A DAY
Qty: 360 TABLET | Refills: 1
End: 2024-07-21

## 2024-03-21 ENCOUNTER — OV EP (OUTPATIENT)
Dept: URBAN - METROPOLITAN AREA CLINIC 2 | Facility: CLINIC | Age: 39
End: 2024-03-21
Payer: MEDICARE

## 2024-03-21 VITALS
BODY MASS INDEX: 36.5 KG/M2 | HEIGHT: 64 IN | DIASTOLIC BLOOD PRESSURE: 69 MMHG | TEMPERATURE: 98.7 F | HEART RATE: 70 BPM | WEIGHT: 213.8 LBS | SYSTOLIC BLOOD PRESSURE: 111 MMHG

## 2024-03-21 DIAGNOSIS — K51.00 ULCERATIVE PANCOLITIS WITHOUT COMPLICATION: ICD-10-CM

## 2024-03-21 DIAGNOSIS — R11.0 NAUSEA: ICD-10-CM

## 2024-03-21 PROCEDURE — 99214 OFFICE O/P EST MOD 30 MIN: CPT | Performed by: NURSE PRACTITIONER

## 2024-03-21 RX ORDER — CYCLOBENZAPRINE HYDROCHLORIDE 10 MG/1
TAKE ONE TABLET BY MOUTH TWICE A DAY AS NEEDED FOR MUSCLE SPASM (PAIN) TABLET, FILM COATED ORAL
Qty: 14 UNSPECIFIED | Refills: 0 | Status: ACTIVE | COMMUNITY

## 2024-03-21 RX ORDER — MESALAMINE 1.2 G/1
4 TABLETS TABLET, DELAYED RELEASE ORAL ONCE A DAY
Qty: 360 TABLET | Refills: 1 | Status: ACTIVE | COMMUNITY
End: 2024-07-21

## 2024-03-21 RX ORDER — PREDNISONE 20 MG/1
1 TABLET TABLET ORAL ONCE A DAY
Qty: 30 | Status: ON HOLD | COMMUNITY
Start: 2021-02-09

## 2024-03-21 RX ORDER — BUDESONIDE 3 MG/1
3 CAPSULES DAILY FOR 2 WEEKS, THEN 2 TABS FOR 2 WEEKS THEN 1 TABLET FOR 2 WEEKS CAPSULE, COATED PELLETS ORAL ONCE A DAY
Qty: 84 CAPSULE | Refills: 0 | Status: ON HOLD | COMMUNITY
Start: 2022-01-06

## 2024-03-21 NOTE — HPI-TODAY'S VISIT:
Very pleasant 39-year-old female seen today in interval follow-up for ulcerative colitis.  Her symptoms remain well-controlled with mesalamine.  She does have rare days with diarrhea.  She denies overt GI bleeding or abdominal pain.  She has noted some nausea that is first thing in the morning.  Occasionally she does take NSAIDs.  She has also been on birth control which may be causing some symptoms..

## 2024-03-25 LAB
A/G RATIO: 1.4
ALBUMIN: 4
ALKALINE PHOSPHATASE: 54
ALT (SGPT): 13
AST (SGOT): 16
BILIRUBIN, TOTAL: 0.3
BUN/CREATININE RATIO: (no result)
BUN: 14
C-REACTIVE PROTEIN, QUANT: 7.9
CALCIUM: 9
CALPROTECTIN, FECAL: (no result)
CARBON DIOXIDE, TOTAL: 24
CHLORIDE: 106
CREATININE: 0.82
EGFR: 94
GLOBULIN, TOTAL: 2.8
GLUCOSE: 89
HEMATOCRIT: 39.2
HEMOGLOBIN: 12.6
MCH: 29.5
MCHC: 32.1
MCV: 91.8
MPV: 11.6
PLATELET COUNT: 297
POTASSIUM: 5
PROTEIN, TOTAL: 6.8
RDW: 12.8
RED BLOOD CELL COUNT: 4.27
SODIUM: 141
WHITE BLOOD CELL COUNT: 10.4

## 2024-07-19 ENCOUNTER — TELEPHONE ENCOUNTER (OUTPATIENT)
Dept: URBAN - METROPOLITAN AREA CLINIC 79 | Facility: CLINIC | Age: 39
End: 2024-07-19

## 2024-07-19 RX ORDER — MESALAMINE 1.2 G/1
4 TABLETS TABLET, DELAYED RELEASE ORAL ONCE A DAY
Qty: 360 TABLET | Refills: 1
End: 2025-01-15

## 2024-09-23 ENCOUNTER — OFFICE VISIT (OUTPATIENT)
Dept: URBAN - METROPOLITAN AREA CLINIC 2 | Facility: CLINIC | Age: 39
End: 2024-09-23
Payer: COMMERCIAL

## 2024-09-23 ENCOUNTER — DASHBOARD ENCOUNTERS (OUTPATIENT)
Age: 39
End: 2024-09-23

## 2024-09-23 VITALS
SYSTOLIC BLOOD PRESSURE: 115 MMHG | DIASTOLIC BLOOD PRESSURE: 69 MMHG | HEIGHT: 64 IN | BODY MASS INDEX: 34.04 KG/M2 | WEIGHT: 199.4 LBS | TEMPERATURE: 98.4 F | HEART RATE: 64 BPM

## 2024-09-23 DIAGNOSIS — K51.00 ULCERATIVE PANCOLITIS WITHOUT COMPLICATION: ICD-10-CM

## 2024-09-23 PROCEDURE — 99213 OFFICE O/P EST LOW 20 MIN: CPT | Performed by: NURSE PRACTITIONER

## 2024-09-23 RX ORDER — MESALAMINE 1.2 G/1
4 TABLETS TABLET, DELAYED RELEASE ORAL ONCE A DAY
Qty: 360 TABLET | Refills: 1 | Status: ACTIVE | COMMUNITY
End: 2025-01-15

## 2024-09-23 RX ORDER — BUDESONIDE 3 MG/1
3 CAPSULES DAILY FOR 2 WEEKS, THEN 2 TABS FOR 2 WEEKS THEN 1 TABLET FOR 2 WEEKS CAPSULE, COATED PELLETS ORAL ONCE A DAY
Qty: 84 CAPSULE | Refills: 0 | Status: ON HOLD | COMMUNITY
Start: 2022-01-06

## 2024-09-23 RX ORDER — CYCLOBENZAPRINE HYDROCHLORIDE 10 MG/1
TAKE ONE TABLET BY MOUTH TWICE A DAY AS NEEDED FOR MUSCLE SPASM (PAIN) TABLET, FILM COATED ORAL
Qty: 14 UNSPECIFIED | Refills: 0 | Status: ACTIVE | COMMUNITY

## 2024-09-23 RX ORDER — PREDNISONE 20 MG/1
1 TABLET TABLET ORAL ONCE A DAY
Qty: 30 | Status: ON HOLD | COMMUNITY
Start: 2021-02-09

## 2024-09-23 NOTE — HPI-TODAY'S VISIT:
Very pleasant 39-year-old female with history of ulcerative pancolitis seen today in interval follow-up her symptoms have been well-controlled with mesalamine until a few months ago. She reports some life stress which she thinks caused the flare. She c/o increased hematochezia and mucus. She is having multiple BM throughout the day.  She is due for colonoscopy.

## 2024-09-26 LAB
A/G RATIO: 1.6
ALBUMIN: 3.9
ALKALINE PHOSPHATASE: 64
ALT (SGPT): 17
AST (SGOT): 17
BILIRUBIN, TOTAL: 0.2
BUN/CREATININE RATIO: (no result)
BUN: 11
C-REACTIVE PROTEIN, QUANT: 14.3
CALCIUM: 9.1
CALPROTECTIN, FECAL: (no result)
CARBON DIOXIDE, TOTAL: 23
CHLORIDE: 107
CREATININE: 0.86
EGFR: 89
GLOBULIN, TOTAL: 2.5
GLUCOSE: 82
HEMATOCRIT: 36.9
HEMOGLOBIN: 12
MCH: 29.5
MCHC: 32.5
MCV: 90.7
MPV: 12.1
PLATELET COUNT: 272
POTASSIUM: 4.4
PROTEIN, TOTAL: 6.4
RDW: 12.5
RED BLOOD CELL COUNT: 4.07
SODIUM: 140
WHITE BLOOD CELL COUNT: 8.2

## 2024-10-03 ENCOUNTER — OFFICE VISIT (OUTPATIENT)
Dept: URBAN - METROPOLITAN AREA SURGERY CENTER 19 | Facility: SURGERY CENTER | Age: 39
End: 2024-10-03
Payer: COMMERCIAL

## 2024-10-03 ENCOUNTER — CLAIMS CREATED FROM THE CLAIM WINDOW (OUTPATIENT)
Dept: URBAN - METROPOLITAN AREA CLINIC 4 | Facility: CLINIC | Age: 39
End: 2024-10-03
Payer: COMMERCIAL

## 2024-10-03 ENCOUNTER — TELEPHONE ENCOUNTER (OUTPATIENT)
Dept: URBAN - METROPOLITAN AREA CLINIC 80 | Facility: CLINIC | Age: 39
End: 2024-10-03

## 2024-10-03 DIAGNOSIS — K63.89 OTHER SPECIFIED DISEASES OF INTESTINE: ICD-10-CM

## 2024-10-03 DIAGNOSIS — K51.90 ULCERATIVE COLITIS WITHOUT COMPLICATIONS, UNSPECIFIED LOCATION: ICD-10-CM

## 2024-10-03 DIAGNOSIS — K51.00 ULCERATIVE (CHRONIC) PANCOLITIS WITHOUT COMPLICATIONS: ICD-10-CM

## 2024-10-03 DIAGNOSIS — K51.919 ULCERATIVE COLITIS, UNSPECIFIED WITH UNSPECIFIED COMPLICATIONS: ICD-10-CM

## 2024-10-03 PROCEDURE — 88342 IMHCHEM/IMCYTCHM 1ST ANTB: CPT | Performed by: PATHOLOGY

## 2024-10-03 PROCEDURE — 45380 COLONOSCOPY AND BIOPSY: CPT | Performed by: INTERNAL MEDICINE

## 2024-10-03 PROCEDURE — 00811 ANES LWR INTST NDSC NOS: CPT | Performed by: NURSE ANESTHETIST, CERTIFIED REGISTERED

## 2024-10-03 PROCEDURE — 88305 TISSUE EXAM BY PATHOLOGIST: CPT | Performed by: PATHOLOGY

## 2024-10-03 RX ORDER — BUDESONIDE 3 MG/1
3 CAPSULES DAILY FOR 2 WEEKS, THEN 2 TABS FOR 2 WEEKS THEN 1 TABLET FOR 2 WEEKS CAPSULE, COATED PELLETS ORAL ONCE A DAY
Qty: 84 CAPSULE | Refills: 0 | Status: ON HOLD | COMMUNITY
Start: 2022-01-06

## 2024-10-03 RX ORDER — MESALAMINE 1.2 G/1
4 TABLETS TABLET, DELAYED RELEASE ORAL ONCE A DAY
Qty: 360 TABLET | Refills: 1 | Status: ACTIVE | COMMUNITY
End: 2025-01-15

## 2024-10-03 RX ORDER — CYCLOBENZAPRINE HYDROCHLORIDE 10 MG/1
TAKE ONE TABLET BY MOUTH TWICE A DAY AS NEEDED FOR MUSCLE SPASM (PAIN) TABLET, FILM COATED ORAL
Qty: 14 UNSPECIFIED | Refills: 0 | Status: ACTIVE | COMMUNITY

## 2024-10-03 RX ORDER — PREDNISONE 20 MG/1
1 TABLET TABLET ORAL ONCE A DAY
Qty: 30 | Status: ON HOLD | COMMUNITY
Start: 2021-02-09

## 2024-10-22 ENCOUNTER — TELEPHONE ENCOUNTER (OUTPATIENT)
Dept: URBAN - METROPOLITAN AREA CLINIC 80 | Facility: CLINIC | Age: 39
End: 2024-10-22

## 2024-10-22 ENCOUNTER — OFFICE VISIT (OUTPATIENT)
Dept: URBAN - METROPOLITAN AREA CLINIC 2 | Facility: CLINIC | Age: 39
End: 2024-10-22
Payer: COMMERCIAL

## 2024-10-22 VITALS
DIASTOLIC BLOOD PRESSURE: 60 MMHG | HEART RATE: 61 BPM | SYSTOLIC BLOOD PRESSURE: 103 MMHG | WEIGHT: 197.8 LBS | HEIGHT: 64 IN | TEMPERATURE: 98 F | BODY MASS INDEX: 33.77 KG/M2

## 2024-10-22 DIAGNOSIS — K51.311 ULCERATIVE RECTOSIGMOIDITIS WITH RECTAL BLEEDING: ICD-10-CM

## 2024-10-22 PROCEDURE — 99213 OFFICE O/P EST LOW 20 MIN: CPT | Performed by: NURSE PRACTITIONER

## 2024-10-22 RX ORDER — PREDNISONE 10 MG/1
TAKE 2 TABLETS DAILY X2 WEEKS, THEN 1 TABLET DAILY X2 WEEKS TABLET ORAL ONCE A DAY
Qty: 42 TABLET | Refills: 0 | OUTPATIENT
Start: 2024-10-22 | End: 2024-11-18

## 2024-10-22 RX ORDER — CYCLOBENZAPRINE HYDROCHLORIDE 10 MG/1
TAKE ONE TABLET BY MOUTH TWICE A DAY AS NEEDED FOR MUSCLE SPASM (PAIN) TABLET, FILM COATED ORAL
Qty: 14 UNSPECIFIED | Refills: 0 | Status: ACTIVE | COMMUNITY

## 2024-10-22 RX ORDER — BUDESONIDE 3 MG/1
3 CAPSULES DAILY FOR 2 WEEKS, THEN 2 TABS FOR 2 WEEKS THEN 1 TABLET FOR 2 WEEKS CAPSULE, COATED PELLETS ORAL ONCE A DAY
Qty: 84 CAPSULE | Refills: 0 | Status: ON HOLD | COMMUNITY
Start: 2022-01-06

## 2024-10-22 RX ORDER — MESALAMINE 1.2 G/1
4 TABLETS TABLET, DELAYED RELEASE ORAL ONCE A DAY
Qty: 360 TABLET | Refills: 1 | Status: ACTIVE | COMMUNITY
End: 2025-01-15

## 2024-10-22 RX ORDER — GUSELKUMAB 200 MG/20ML
20 ML FOR 3 DOSES INJECTION INTRAVENOUS
Qty: 20 MILLILITER | Refills: 2 | OUTPATIENT
Start: 2024-10-22 | End: 2025-01-19

## 2024-10-22 RX ORDER — PREDNISONE 20 MG/1
1 TABLET TABLET ORAL ONCE A DAY
Qty: 30 | Status: ON HOLD | COMMUNITY
Start: 2021-02-09

## 2024-10-22 NOTE — HPI-TODAY'S VISIT:
Very pleasant 39-year-old female with history of ulcerative pancolitis seen today in follow-up after a  colonoscopy.  She had been having mild flare with increasing hematochezia and multiple bowel movements throughout the day.  We did check labs that was notable for an elevated fecal calprotectin at 14.3.  We ordered a calprotectin but this was not completed.  She has been compliant with mesalamine.  Colonoscopy was notable for inflammation from the rectum to sigmoid colon.  Biopsies were notable for chronic active colitis.  Patient is seen in follow-up today. She c/o abdominal pain and multiple BM/day associated with hematochezia. She is not resting well . c/o fatigue.

## 2024-10-25 LAB
HBSAG SCREEN: (no result)
HEP A AB, IGM: (no result)
HEP B CORE AB, IGM: (no result)
HEPATITIS C ANTIBODY: (no result)
MITOGEN-NIL: 7.8
QUANTIFERON NIL VALUE: 0.03
QUANTIFERON TB1 AG VALUE: <0
QUANTIFERON TB2 AG VALUE: <0
QUANTIFERON-TB GOLD PLUS: NEGATIVE

## 2024-10-28 ENCOUNTER — TELEPHONE ENCOUNTER (OUTPATIENT)
Dept: URBAN - METROPOLITAN AREA CLINIC 80 | Facility: CLINIC | Age: 39
End: 2024-10-28

## 2024-11-25 ENCOUNTER — TELEPHONE ENCOUNTER (OUTPATIENT)
Dept: URBAN - METROPOLITAN AREA CLINIC 2 | Facility: CLINIC | Age: 39
End: 2024-11-25

## 2025-02-06 ENCOUNTER — TELEPHONE ENCOUNTER (OUTPATIENT)
Dept: URBAN - METROPOLITAN AREA CLINIC 79 | Facility: CLINIC | Age: 40
End: 2025-02-06

## 2025-02-19 ENCOUNTER — TELEPHONE ENCOUNTER (OUTPATIENT)
Dept: URBAN - METROPOLITAN AREA CLINIC 80 | Facility: CLINIC | Age: 40
End: 2025-02-19

## 2025-02-19 ENCOUNTER — OFFICE VISIT (OUTPATIENT)
Dept: URBAN - METROPOLITAN AREA CLINIC 2 | Facility: CLINIC | Age: 40
End: 2025-02-19
Payer: COMMERCIAL

## 2025-02-19 VITALS
HEART RATE: 66 BPM | WEIGHT: 198 LBS | BODY MASS INDEX: 33.8 KG/M2 | SYSTOLIC BLOOD PRESSURE: 104 MMHG | TEMPERATURE: 98.1 F | DIASTOLIC BLOOD PRESSURE: 70 MMHG | HEIGHT: 64 IN

## 2025-02-19 DIAGNOSIS — K51.311 ULCERATIVE RECTOSIGMOIDITIS WITH RECTAL BLEEDING: ICD-10-CM

## 2025-02-19 PROCEDURE — 99213 OFFICE O/P EST LOW 20 MIN: CPT | Performed by: NURSE PRACTITIONER

## 2025-02-19 RX ORDER — PREDNISONE 20 MG/1
1 TABLET TABLET ORAL ONCE A DAY
Qty: 30 | Status: ON HOLD | COMMUNITY
Start: 2021-02-09

## 2025-02-19 RX ORDER — CYCLOBENZAPRINE HYDROCHLORIDE 10 MG/1
TAKE ONE TABLET BY MOUTH TWICE A DAY AS NEEDED FOR MUSCLE SPASM (PAIN) TABLET, FILM COATED ORAL
Qty: 14 UNSPECIFIED | Refills: 0 | Status: DISCONTINUED | COMMUNITY

## 2025-02-19 RX ORDER — BUDESONIDE 3 MG/1
3 CAPSULES DAILY FOR 2 WEEKS, THEN 2 TABS FOR 2 WEEKS THEN 1 TABLET FOR 2 WEEKS CAPSULE, COATED PELLETS ORAL ONCE A DAY
Qty: 84 CAPSULE | Refills: 0 | Status: ON HOLD | COMMUNITY
Start: 2022-01-06

## 2025-02-19 NOTE — HPI-TODAY'S VISIT:
Very pleasant 39-year-old female with history of ulcerative colitis seen in follow-up today.  Patient was last seen in office in October after colonoscopy which was notable for moderate to severe rectosigmoid colitis.  Patient was started on Tremfya.  She is seen in follow-up today.  She has had the first 2 loading doses.  However the third dose was postponed to March due to a scheduled hysterectomy on February 20. She returns today. UC symptoms have improved. She is not having bleeding. and stools are less frequent. occasional cramping.

## 2025-03-05 ENCOUNTER — LAB OUTSIDE AN ENCOUNTER (OUTPATIENT)
Dept: URBAN - METROPOLITAN AREA CLINIC 80 | Facility: CLINIC | Age: 40
End: 2025-03-05

## 2025-03-07 LAB — CALPROTECTIN, STOOL - QDX: (no result)

## 2025-03-13 ENCOUNTER — TELEPHONE ENCOUNTER (OUTPATIENT)
Dept: URBAN - METROPOLITAN AREA CLINIC 79 | Facility: CLINIC | Age: 40
End: 2025-03-13

## 2025-03-13 RX ORDER — GUSELKUMAB 200 MG/2ML
2 ML INJECTION SUBCUTANEOUS
Qty: 2 PEN NEEDLE | Refills: 5 | OUTPATIENT
Start: 2025-03-13 | End: 2025-08-28

## 2025-05-19 ENCOUNTER — OFFICE VISIT (OUTPATIENT)
Dept: URBAN - METROPOLITAN AREA CLINIC 2 | Facility: CLINIC | Age: 40
End: 2025-05-19
Payer: COMMERCIAL

## 2025-05-19 DIAGNOSIS — K51.311 ULCERATIVE RECTOSIGMOIDITIS WITH RECTAL BLEEDING: ICD-10-CM

## 2025-05-19 PROCEDURE — 99213 OFFICE O/P EST LOW 20 MIN: CPT | Performed by: NURSE PRACTITIONER

## 2025-05-19 RX ORDER — BUDESONIDE 3 MG/1
3 CAPSULES DAILY FOR 2 WEEKS, THEN 2 TABS FOR 2 WEEKS THEN 1 TABLET FOR 2 WEEKS CAPSULE, COATED PELLETS ORAL ONCE A DAY
Qty: 84 CAPSULE | Refills: 0 | Status: ON HOLD | COMMUNITY
Start: 2022-01-06

## 2025-05-19 RX ORDER — PREDNISONE 20 MG/1
1 TABLET TABLET ORAL ONCE A DAY
Qty: 30 | Status: ON HOLD | COMMUNITY
Start: 2021-02-09

## 2025-05-19 RX ORDER — GUSELKUMAB 200 MG/2ML
2 ML INJECTION SUBCUTANEOUS
Qty: 2 PEN NEEDLE | Refills: 5 | Status: ACTIVE | COMMUNITY
Start: 2025-03-13 | End: 2025-08-28

## 2025-05-19 NOTE — HPI-TODAY'S VISIT:
Very pleasant 40-year-old female with history of ulcerative colitis seen today in follow-up for increased mucus in her stools.  This has always been her sign for inflammation.  She does note some increased stress has been ongoing for about 3 weeks now and wonders if this may be the trigger.  Overall, her symptoms have been much improved since starting Tremfya.  Only rare blood when she wipes.  She had a partial hysterectomy and feels much better in that respect.  She did have some mild constipation after surgery and stool softeners have helped.

## 2025-05-27 LAB
ALBUMIN/GLOBULIN RATIO: 1.8
ALBUMIN: 4.2
ALKALINE PHOSPHATASE: 63
ALT: 16
AST: 18
BILIRUBIN, TOTAL: 0.3
BUN/CREATININE RATIO: (no result)
C-REACTIVE PROTEIN, QUANT: <3
CALCIUM: 9.2
CARBON DIOXIDE: 28
CHLORIDE: 105
CREATININE: 0.93
GLOBULIN: 2.3
GLUCOSE: 81
HEMATOCRIT: 39.8
HEMOGLOBIN: 12.3
MCH: 28.5
MCHC: 30.9
MCV: 92.3
MPV: 11.2
PLATELET COUNT: 326
POTASSIUM: 4.5
PROTEIN, TOTAL: 6.5
RDW: 13.1
RED BLOOD CELL COUNT: 4.31
SODIUM: 141
UREA NITROGEN (BUN): 11
WHITE BLOOD CELL COUNT: 6.4

## 2025-05-29 LAB — CALPROTECTIN, FECAL: 13

## 2025-08-19 ENCOUNTER — OFFICE VISIT (OUTPATIENT)
Dept: URBAN - METROPOLITAN AREA CLINIC 2 | Facility: CLINIC | Age: 40
End: 2025-08-19